# Patient Record
Sex: MALE | Race: WHITE | Employment: OTHER | URBAN - METROPOLITAN AREA
[De-identification: names, ages, dates, MRNs, and addresses within clinical notes are randomized per-mention and may not be internally consistent; named-entity substitution may affect disease eponyms.]

---

## 2019-04-06 ENCOUNTER — APPOINTMENT (OUTPATIENT)
Dept: CT IMAGING | Age: 39
End: 2019-04-06
Attending: PHYSICIAN ASSISTANT
Payer: COMMERCIAL

## 2019-04-06 ENCOUNTER — APPOINTMENT (OUTPATIENT)
Dept: GENERAL RADIOLOGY | Age: 39
End: 2019-04-06
Attending: INTERNAL MEDICINE
Payer: COMMERCIAL

## 2019-04-06 ENCOUNTER — HOSPITAL ENCOUNTER (OUTPATIENT)
Age: 39
Setting detail: OBSERVATION
Discharge: HOME | End: 2019-04-08
Attending: EMERGENCY MEDICINE | Admitting: INTERNAL MEDICINE
Payer: COMMERCIAL

## 2019-04-06 DIAGNOSIS — E87.6 HYPOKALEMIA: ICD-10-CM

## 2019-04-06 DIAGNOSIS — R42 ACUTE ONSET OF SEVERE VERTIGO: Primary | ICD-10-CM

## 2019-04-06 DIAGNOSIS — H81.10 BENIGN PAROXYSMAL POSITIONAL VERTIGO, UNSPECIFIED LATERALITY: ICD-10-CM

## 2019-04-06 PROBLEM — G45.9 TIA (TRANSIENT ISCHEMIC ATTACK): Status: ACTIVE | Noted: 2019-04-06

## 2019-04-06 LAB
ALBUMIN SERPL-MCNC: 4 G/DL (ref 3.5–5)
ALBUMIN/GLOB SERPL: 1.3 {RATIO} (ref 1.1–2.2)
ALP SERPL-CCNC: 55 U/L (ref 45–117)
ALT SERPL-CCNC: 63 U/L (ref 12–78)
ANION GAP SERPL CALC-SCNC: 11 MMOL/L (ref 5–15)
AST SERPL-CCNC: 30 U/L (ref 15–37)
ATRIAL RATE: 77 BPM
BASOPHILS # BLD: 0 K/UL (ref 0–0.1)
BASOPHILS NFR BLD: 0 % (ref 0–1)
BILIRUB SERPL-MCNC: 0.9 MG/DL (ref 0.2–1)
BUN SERPL-MCNC: 20 MG/DL (ref 6–20)
BUN/CREAT SERPL: 21 (ref 12–20)
CALCIUM SERPL-MCNC: 8.4 MG/DL (ref 8.5–10.1)
CALCULATED P AXIS, ECG09: 27 DEGREES
CALCULATED R AXIS, ECG10: 29 DEGREES
CALCULATED T AXIS, ECG11: 12 DEGREES
CHLORIDE SERPL-SCNC: 103 MMOL/L (ref 97–108)
CO2 SERPL-SCNC: 24 MMOL/L (ref 21–32)
CREAT SERPL-MCNC: 0.94 MG/DL (ref 0.7–1.3)
DIAGNOSIS, 93000: NORMAL
DIFFERENTIAL METHOD BLD: ABNORMAL
EOSINOPHIL # BLD: 0.2 K/UL (ref 0–0.4)
EOSINOPHIL NFR BLD: 2 % (ref 0–7)
ERYTHROCYTE [DISTWIDTH] IN BLOOD BY AUTOMATED COUNT: 12.4 % (ref 11.5–14.5)
GLOBULIN SER CALC-MCNC: 3 G/DL (ref 2–4)
GLUCOSE SERPL-MCNC: 154 MG/DL (ref 65–100)
HCT VFR BLD AUTO: 43.2 % (ref 36.6–50.3)
HGB BLD-MCNC: 16.1 G/DL (ref 12.1–17)
IMM GRANULOCYTES # BLD AUTO: 0.1 K/UL (ref 0–0.04)
IMM GRANULOCYTES NFR BLD AUTO: 1 % (ref 0–0.5)
LYMPHOCYTES # BLD: 2.3 K/UL (ref 0.8–3.5)
LYMPHOCYTES NFR BLD: 23 % (ref 12–49)
MCH RBC QN AUTO: 30.8 PG (ref 26–34)
MCHC RBC AUTO-ENTMCNC: 37.3 G/DL (ref 30–36.5)
MCV RBC AUTO: 82.8 FL (ref 80–99)
MONOCYTES # BLD: 0.4 K/UL (ref 0–1)
MONOCYTES NFR BLD: 4 % (ref 5–13)
NEUTS SEG # BLD: 7.1 K/UL (ref 1.8–8)
NEUTS SEG NFR BLD: 70 % (ref 32–75)
NRBC # BLD: 0 K/UL (ref 0–0.01)
NRBC BLD-RTO: 0 PER 100 WBC
P-R INTERVAL, ECG05: 138 MS
PLATELET # BLD AUTO: 201 K/UL (ref 150–400)
PMV BLD AUTO: 11.5 FL (ref 8.9–12.9)
POTASSIUM SERPL-SCNC: 2.7 MMOL/L (ref 3.5–5.1)
PROT SERPL-MCNC: 7 G/DL (ref 6.4–8.2)
Q-T INTERVAL, ECG07: 416 MS
QRS DURATION, ECG06: 90 MS
QTC CALCULATION (BEZET), ECG08: 470 MS
RBC # BLD AUTO: 5.22 M/UL (ref 4.1–5.7)
SODIUM SERPL-SCNC: 138 MMOL/L (ref 136–145)
TROPONIN I SERPL-MCNC: <0.05 NG/ML
VENTRICULAR RATE, ECG03: 77 BPM
WBC # BLD AUTO: 10.2 K/UL (ref 4.1–11.1)

## 2019-04-06 PROCEDURE — 74011250636 HC RX REV CODE- 250/636: Performed by: PHYSICIAN ASSISTANT

## 2019-04-06 PROCEDURE — 85025 COMPLETE CBC W/AUTO DIFF WBC: CPT

## 2019-04-06 PROCEDURE — 96361 HYDRATE IV INFUSION ADD-ON: CPT

## 2019-04-06 PROCEDURE — 96366 THER/PROPH/DIAG IV INF ADDON: CPT

## 2019-04-06 PROCEDURE — 96365 THER/PROPH/DIAG IV INF INIT: CPT

## 2019-04-06 PROCEDURE — 99218 HC RM OBSERVATION: CPT

## 2019-04-06 PROCEDURE — 96375 TX/PRO/DX INJ NEW DRUG ADDON: CPT

## 2019-04-06 PROCEDURE — 36415 COLL VENOUS BLD VENIPUNCTURE: CPT

## 2019-04-06 PROCEDURE — 96372 THER/PROPH/DIAG INJ SC/IM: CPT

## 2019-04-06 PROCEDURE — 80053 COMPREHEN METABOLIC PANEL: CPT

## 2019-04-06 PROCEDURE — 99284 EMERGENCY DEPT VISIT MOD MDM: CPT

## 2019-04-06 PROCEDURE — 84484 ASSAY OF TROPONIN QUANT: CPT

## 2019-04-06 PROCEDURE — 74011250636 HC RX REV CODE- 250/636: Performed by: INTERNAL MEDICINE

## 2019-04-06 PROCEDURE — 74011250636 HC RX REV CODE- 250/636

## 2019-04-06 PROCEDURE — 71045 X-RAY EXAM CHEST 1 VIEW: CPT

## 2019-04-06 PROCEDURE — 70450 CT HEAD/BRAIN W/O DYE: CPT

## 2019-04-06 RX ORDER — ONDANSETRON 2 MG/ML
INJECTION INTRAMUSCULAR; INTRAVENOUS
Status: COMPLETED
Start: 2019-04-06 | End: 2019-04-06

## 2019-04-06 RX ORDER — SODIUM CHLORIDE 9 MG/ML
1000 INJECTION, SOLUTION INTRAVENOUS CONTINUOUS
Status: DISCONTINUED | OUTPATIENT
Start: 2019-04-06 | End: 2019-04-06

## 2019-04-06 RX ORDER — SODIUM CHLORIDE 0.9 % (FLUSH) 0.9 %
5-40 SYRINGE (ML) INJECTION AS NEEDED
Status: DISCONTINUED | OUTPATIENT
Start: 2019-04-06 | End: 2019-04-08 | Stop reason: HOSPADM

## 2019-04-06 RX ORDER — ACETAMINOPHEN 325 MG/1
650 TABLET ORAL
Status: DISCONTINUED | OUTPATIENT
Start: 2019-04-06 | End: 2019-04-08 | Stop reason: HOSPADM

## 2019-04-06 RX ORDER — MECLIZINE HCL 12.5 MG 12.5 MG/1
25 TABLET ORAL
Status: DISCONTINUED | OUTPATIENT
Start: 2019-04-06 | End: 2019-04-08 | Stop reason: HOSPADM

## 2019-04-06 RX ORDER — METOCLOPRAMIDE HYDROCHLORIDE 5 MG/ML
5 INJECTION INTRAMUSCULAR; INTRAVENOUS EVERY 6 HOURS
Status: DISCONTINUED | OUTPATIENT
Start: 2019-04-07 | End: 2019-04-08 | Stop reason: HOSPADM

## 2019-04-06 RX ORDER — SODIUM CHLORIDE 9 MG/ML
75 INJECTION, SOLUTION INTRAVENOUS CONTINUOUS
Status: DISCONTINUED | OUTPATIENT
Start: 2019-04-06 | End: 2019-04-07

## 2019-04-06 RX ORDER — CITALOPRAM 10 MG/1
TABLET ORAL DAILY
COMMUNITY

## 2019-04-06 RX ORDER — POTASSIUM CHLORIDE 7.45 MG/ML
10 INJECTION INTRAVENOUS
Status: COMPLETED | OUTPATIENT
Start: 2019-04-06 | End: 2019-04-06

## 2019-04-06 RX ORDER — GUAIFENESIN 100 MG/5ML
81 LIQUID (ML) ORAL DAILY
Status: DISCONTINUED | OUTPATIENT
Start: 2019-04-07 | End: 2019-04-08 | Stop reason: HOSPADM

## 2019-04-06 RX ORDER — CITALOPRAM 20 MG/1
10 TABLET, FILM COATED ORAL DAILY
Status: DISCONTINUED | OUTPATIENT
Start: 2019-04-07 | End: 2019-04-06

## 2019-04-06 RX ORDER — DIAZEPAM 10 MG/2ML
5 INJECTION INTRAMUSCULAR
Status: DISCONTINUED | OUTPATIENT
Start: 2019-04-06 | End: 2019-04-06

## 2019-04-06 RX ORDER — ONDANSETRON 2 MG/ML
4 INJECTION INTRAMUSCULAR; INTRAVENOUS
Status: COMPLETED | OUTPATIENT
Start: 2019-04-06 | End: 2019-04-06

## 2019-04-06 RX ORDER — ONDANSETRON 2 MG/ML
4 INJECTION INTRAMUSCULAR; INTRAVENOUS
Status: DISCONTINUED | OUTPATIENT
Start: 2019-04-06 | End: 2019-04-08 | Stop reason: HOSPADM

## 2019-04-06 RX ORDER — LORAZEPAM 2 MG/ML
1 INJECTION INTRAMUSCULAR
Status: COMPLETED | OUTPATIENT
Start: 2019-04-06 | End: 2019-04-06

## 2019-04-06 RX ORDER — POTASSIUM CHLORIDE 750 MG/1
40 TABLET, FILM COATED, EXTENDED RELEASE ORAL
Status: COMPLETED | OUTPATIENT
Start: 2019-04-07 | End: 2019-04-07

## 2019-04-06 RX ORDER — POTASSIUM CHLORIDE 7.45 MG/ML
10 INJECTION INTRAVENOUS
Status: DISPENSED | OUTPATIENT
Start: 2019-04-06 | End: 2019-04-07

## 2019-04-06 RX ORDER — HEPARIN SODIUM 5000 [USP'U]/ML
5000 INJECTION, SOLUTION INTRAVENOUS; SUBCUTANEOUS EVERY 8 HOURS
Status: DISCONTINUED | OUTPATIENT
Start: 2019-04-06 | End: 2019-04-08 | Stop reason: HOSPADM

## 2019-04-06 RX ORDER — ONDANSETRON 2 MG/ML
4 INJECTION INTRAMUSCULAR; INTRAVENOUS
Status: DISCONTINUED | OUTPATIENT
Start: 2019-04-06 | End: 2019-04-06

## 2019-04-06 RX ORDER — POTASSIUM CHLORIDE AND SODIUM CHLORIDE 900; 300 MG/100ML; MG/100ML
INJECTION, SOLUTION INTRAVENOUS CONTINUOUS
Status: DISCONTINUED | OUTPATIENT
Start: 2019-04-06 | End: 2019-04-06

## 2019-04-06 RX ORDER — ACETAMINOPHEN 650 MG/1
650 SUPPOSITORY RECTAL
Status: DISCONTINUED | OUTPATIENT
Start: 2019-04-06 | End: 2019-04-08 | Stop reason: HOSPADM

## 2019-04-06 RX ORDER — SODIUM CHLORIDE 0.9 % (FLUSH) 0.9 %
5-40 SYRINGE (ML) INJECTION EVERY 8 HOURS
Status: DISCONTINUED | OUTPATIENT
Start: 2019-04-06 | End: 2019-04-08 | Stop reason: HOSPADM

## 2019-04-06 RX ORDER — MECLIZINE HCL 12.5 MG 12.5 MG/1
50 TABLET ORAL
Status: COMPLETED | OUTPATIENT
Start: 2019-04-06 | End: 2019-04-06

## 2019-04-06 RX ADMIN — ONDANSETRON 4 MG: 2 INJECTION INTRAMUSCULAR; INTRAVENOUS at 19:39

## 2019-04-06 RX ADMIN — SODIUM CHLORIDE 1000 ML: 900 INJECTION, SOLUTION INTRAVENOUS at 19:33

## 2019-04-06 RX ADMIN — Medication 10 ML: at 23:02

## 2019-04-06 RX ADMIN — LORAZEPAM 1 MG: 2 INJECTION INTRAMUSCULAR; INTRAVENOUS at 20:01

## 2019-04-06 RX ADMIN — MECLIZINE 50 MG: 12.5 TABLET ORAL at 19:33

## 2019-04-06 RX ADMIN — POTASSIUM CHLORIDE 10 MEQ: 10 INJECTION, SOLUTION INTRAVENOUS at 20:01

## 2019-04-06 RX ADMIN — SODIUM CHLORIDE 75 ML/HR: 900 INJECTION, SOLUTION INTRAVENOUS at 22:53

## 2019-04-06 RX ADMIN — SODIUM CHLORIDE AND POTASSIUM CHLORIDE: 9; 2.98 INJECTION, SOLUTION INTRAVENOUS at 21:46

## 2019-04-06 RX ADMIN — POTASSIUM CHLORIDE 10 MEQ: 10 INJECTION, SOLUTION INTRAVENOUS at 22:54

## 2019-04-06 RX ADMIN — HEPARIN SODIUM 5000 UNITS: 5000 INJECTION INTRAVENOUS; SUBCUTANEOUS at 23:01

## 2019-04-06 NOTE — ED NOTES
Pt vomited x1 when being moved to go to CT. Provider aware; zofran given. Providers at bedside. Imaging will return.

## 2019-04-06 NOTE — ED NOTES
Assumed care. Initial assessment complete and provider aware. Pt placed on stretcher in low/locked position, side-rail up, and call bell within reach. Warm blanket offered. Average wait times discussed with pt. No additional requests at this time.

## 2019-04-07 ENCOUNTER — APPOINTMENT (OUTPATIENT)
Dept: MRI IMAGING | Age: 39
End: 2019-04-07
Attending: INTERNAL MEDICINE
Payer: COMMERCIAL

## 2019-04-07 LAB
ANION GAP SERPL CALC-SCNC: 7 MMOL/L (ref 5–15)
BASOPHILS # BLD: 0 K/UL (ref 0–0.1)
BASOPHILS NFR BLD: 0 % (ref 0–1)
BUN SERPL-MCNC: 16 MG/DL (ref 6–20)
BUN/CREAT SERPL: 22 (ref 12–20)
CALCIUM SERPL-MCNC: 8.3 MG/DL (ref 8.5–10.1)
CHLORIDE SERPL-SCNC: 105 MMOL/L (ref 97–108)
CHOLEST SERPL-MCNC: 180 MG/DL
CO2 SERPL-SCNC: 27 MMOL/L (ref 21–32)
CREAT SERPL-MCNC: 0.74 MG/DL (ref 0.7–1.3)
DIFFERENTIAL METHOD BLD: ABNORMAL
EOSINOPHIL # BLD: 0 K/UL (ref 0–0.4)
EOSINOPHIL NFR BLD: 0 % (ref 0–7)
ERYTHROCYTE [DISTWIDTH] IN BLOOD BY AUTOMATED COUNT: 12.8 % (ref 11.5–14.5)
EST. AVERAGE GLUCOSE BLD GHB EST-MCNC: NORMAL MG/DL
GLUCOSE SERPL-MCNC: 110 MG/DL (ref 65–100)
HBA1C MFR BLD: 4.9 % (ref 4.2–6.3)
HCT VFR BLD AUTO: 39.5 % (ref 36.6–50.3)
HDLC SERPL-MCNC: 41 MG/DL
HDLC SERPL: 4.4 {RATIO} (ref 0–5)
HGB BLD-MCNC: 14.7 G/DL (ref 12.1–17)
IMM GRANULOCYTES # BLD AUTO: 0 K/UL (ref 0–0.04)
IMM GRANULOCYTES NFR BLD AUTO: 0 % (ref 0–0.5)
LDLC SERPL CALC-MCNC: 95.4 MG/DL (ref 0–100)
LIPID PROFILE,FLP: ABNORMAL
LYMPHOCYTES # BLD: 1 K/UL (ref 0.8–3.5)
LYMPHOCYTES NFR BLD: 10 % (ref 12–49)
MCH RBC QN AUTO: 31.6 PG (ref 26–34)
MCHC RBC AUTO-ENTMCNC: 37.2 G/DL (ref 30–36.5)
MCV RBC AUTO: 84.9 FL (ref 80–99)
MONOCYTES # BLD: 0.6 K/UL (ref 0–1)
MONOCYTES NFR BLD: 5 % (ref 5–13)
NEUTS SEG # BLD: 8.6 K/UL (ref 1.8–8)
NEUTS SEG NFR BLD: 85 % (ref 32–75)
NRBC # BLD: 0 K/UL (ref 0–0.01)
NRBC BLD-RTO: 0 PER 100 WBC
PLATELET # BLD AUTO: 164 K/UL (ref 150–400)
PMV BLD AUTO: 11.3 FL (ref 8.9–12.9)
POTASSIUM SERPL-SCNC: 4.2 MMOL/L (ref 3.5–5.1)
RBC # BLD AUTO: 4.65 M/UL (ref 4.1–5.7)
SODIUM SERPL-SCNC: 139 MMOL/L (ref 136–145)
TRIGL SERPL-MCNC: 218 MG/DL (ref ?–150)
TSH SERPL DL<=0.05 MIU/L-ACNC: 0.97 UIU/ML (ref 0.36–3.74)
VLDLC SERPL CALC-MCNC: 43.6 MG/DL
WBC # BLD AUTO: 10.2 K/UL (ref 4.1–11.1)

## 2019-04-07 PROCEDURE — 36415 COLL VENOUS BLD VENIPUNCTURE: CPT

## 2019-04-07 PROCEDURE — 74011250636 HC RX REV CODE- 250/636: Performed by: INTERNAL MEDICINE

## 2019-04-07 PROCEDURE — 84443 ASSAY THYROID STIM HORMONE: CPT

## 2019-04-07 PROCEDURE — 96372 THER/PROPH/DIAG INJ SC/IM: CPT

## 2019-04-07 PROCEDURE — 96366 THER/PROPH/DIAG IV INF ADDON: CPT

## 2019-04-07 PROCEDURE — 96376 TX/PRO/DX INJ SAME DRUG ADON: CPT

## 2019-04-07 PROCEDURE — 74011250637 HC RX REV CODE- 250/637: Performed by: INTERNAL MEDICINE

## 2019-04-07 PROCEDURE — 99218 HC RM OBSERVATION: CPT

## 2019-04-07 PROCEDURE — 70551 MRI BRAIN STEM W/O DYE: CPT

## 2019-04-07 PROCEDURE — 80061 LIPID PANEL: CPT

## 2019-04-07 PROCEDURE — 80048 BASIC METABOLIC PNL TOTAL CA: CPT

## 2019-04-07 PROCEDURE — 85025 COMPLETE CBC W/AUTO DIFF WBC: CPT

## 2019-04-07 PROCEDURE — 83036 HEMOGLOBIN GLYCOSYLATED A1C: CPT

## 2019-04-07 PROCEDURE — 74011250637 HC RX REV CODE- 250/637: Performed by: HOSPITALIST

## 2019-04-07 PROCEDURE — 96375 TX/PRO/DX INJ NEW DRUG ADDON: CPT

## 2019-04-07 RX ORDER — LANOLIN ALCOHOL/MO/W.PET/CERES
6 CREAM (GRAM) TOPICAL
Status: DISCONTINUED | OUTPATIENT
Start: 2019-04-07 | End: 2019-04-08 | Stop reason: HOSPADM

## 2019-04-07 RX ORDER — ATORVASTATIN CALCIUM 40 MG/1
40 TABLET, FILM COATED ORAL
Status: DISCONTINUED | OUTPATIENT
Start: 2019-04-07 | End: 2019-04-08 | Stop reason: HOSPADM

## 2019-04-07 RX ORDER — POTASSIUM CHLORIDE 7.45 MG/ML
10 INJECTION INTRAVENOUS
Status: COMPLETED | OUTPATIENT
Start: 2019-04-07 | End: 2019-04-07

## 2019-04-07 RX ORDER — SODIUM CHLORIDE 9 MG/ML
75 INJECTION, SOLUTION INTRAVENOUS CONTINUOUS
Status: DISPENSED | OUTPATIENT
Start: 2019-04-07 | End: 2019-04-07

## 2019-04-07 RX ADMIN — HEPARIN SODIUM 5000 UNITS: 5000 INJECTION INTRAVENOUS; SUBCUTANEOUS at 13:35

## 2019-04-07 RX ADMIN — ASPIRIN 81 MG 81 MG: 81 TABLET ORAL at 08:53

## 2019-04-07 RX ADMIN — HEPARIN SODIUM 5000 UNITS: 5000 INJECTION INTRAVENOUS; SUBCUTANEOUS at 05:32

## 2019-04-07 RX ADMIN — METOCLOPRAMIDE 5 MG: 5 INJECTION, SOLUTION INTRAMUSCULAR; INTRAVENOUS at 00:29

## 2019-04-07 RX ADMIN — Medication 10 ML: at 05:33

## 2019-04-07 RX ADMIN — HEPARIN SODIUM 5000 UNITS: 5000 INJECTION INTRAVENOUS; SUBCUTANEOUS at 22:26

## 2019-04-07 RX ADMIN — Medication 10 ML: at 13:39

## 2019-04-07 RX ADMIN — SODIUM CHLORIDE 75 ML/HR: 900 INJECTION, SOLUTION INTRAVENOUS at 13:36

## 2019-04-07 RX ADMIN — METOCLOPRAMIDE 5 MG: 5 INJECTION, SOLUTION INTRAMUSCULAR; INTRAVENOUS at 18:00

## 2019-04-07 RX ADMIN — Medication 10 ML: at 22:27

## 2019-04-07 RX ADMIN — POTASSIUM CHLORIDE 10 MEQ: 10 INJECTION, SOLUTION INTRAVENOUS at 00:28

## 2019-04-07 RX ADMIN — ONDANSETRON 4 MG: 2 INJECTION INTRAMUSCULAR; INTRAVENOUS at 08:55

## 2019-04-07 RX ADMIN — METOCLOPRAMIDE 5 MG: 5 INJECTION, SOLUTION INTRAMUSCULAR; INTRAVENOUS at 23:24

## 2019-04-07 RX ADMIN — METOCLOPRAMIDE 5 MG: 5 INJECTION, SOLUTION INTRAMUSCULAR; INTRAVENOUS at 13:35

## 2019-04-07 RX ADMIN — POTASSIUM CHLORIDE 40 MEQ: 750 TABLET, EXTENDED RELEASE ORAL at 02:57

## 2019-04-07 RX ADMIN — ATORVASTATIN CALCIUM 40 MG: 40 TABLET, FILM COATED ORAL at 22:26

## 2019-04-07 RX ADMIN — METOCLOPRAMIDE 5 MG: 5 INJECTION, SOLUTION INTRAMUSCULAR; INTRAVENOUS at 05:32

## 2019-04-07 RX ADMIN — MELATONIN TAB 3 MG 6 MG: 3 TAB at 23:24

## 2019-04-07 RX ADMIN — ONDANSETRON 4 MG: 2 INJECTION INTRAMUSCULAR; INTRAVENOUS at 03:04

## 2019-04-07 RX ADMIN — POTASSIUM CHLORIDE 10 MEQ: 10 INJECTION, SOLUTION INTRAVENOUS at 02:57

## 2019-04-07 RX ADMIN — MECLIZINE 25 MG: 12.5 TABLET ORAL at 09:03

## 2019-04-07 RX ADMIN — Medication 10 ML: at 23:26

## 2019-04-07 NOTE — ROUTINE PROCESS
Bedside and Verbal shift change report given to Arianne (oncoming nurse) by Charles Mccain (offgoing nurse). Report included the following information SBAR, Kardex, ED Summary, MAR, Recent Results and Cardiac Rhythm NSR. Zone Phone:   [phone] Significant changes during shift:  Seen by neuro, speech, MRI Patient Information Ronald Staples 
39 y.o. 
4/6/2019  6:53 PM by Shannon Dixon MD. Ronald Staples was admitted from Home 
 
Problem List 
 
Patient Active Problem List  
 Diagnosis Date Noted  TIA (transient ischemic attack) 04/06/2019  BPPV (benign paroxysmal positional vertigo) 04/06/2019 No past medical history on file. Core Measures: CVA: Yes Yes CHF:No No 
PNA:No No 
 
Activity Status: OOB to Chair Yes Ambulated this shift No  
Bed Rest No 
 
 
 
LINES AND DRAINS: 
 
PIV 
 
DVT prophylaxis: DVT prophylaxis Med- Yes DVT prophylaxis SCD or CLAUDIA- No  
 
Wounds: (If Applicable) Wounds- No 
 
Location Patient Safety: 
 
Falls Score Total Score: 1 Safety Level_______ Bed Alarm On? No 
Sitter? No 
 
Plan for upcoming shift: echo, carotids Discharge Plan: No  
 
Active Consults: 
IP CONSULT TO HOSPITALIST 
IP CONSULT TO NEUROLOGY

## 2019-04-07 NOTE — PROGRESS NOTES
Speech pathology Patient in with nausea and vomiting. MRI negative. STAND completed and WFL. No changes in speech. Formal speech eval not indicated. Will complete orders.  Shaina Mir M.S. MORGAN-SLP

## 2019-04-07 NOTE — PROGRESS NOTES
Pt took oral Potassium as ordered. Within minutes of taking the pills he began to vomit a lot. Gave Zofran IV.

## 2019-04-07 NOTE — ED PROVIDER NOTES
EMERGENCY DEPARTMENT HISTORY AND PHYSICAL EXAM 
 
 
Date: 4/6/2019 Patient Name: Jersey Vivar History of Presenting Illness Chief Complaint Patient presents with  Vomiting Patient arrvied via EMS for vomiting with dizziness, BS en route 144, wife reports pt became dizzy and diaphoretic with vomiting 1.5 hours PTA  Dizziness History Provided By: Patient, Patient's Mother and Patient's Wife HPI: Jersey Vivar, 42 y.o. male with PMHx significant for depression, presents via EMS to the ED with cc of sudden onset dizziness 1.5 hours ago. Patient was standing in the kitchen when he suddenly began feeling like the room was spinning. He had to lower himself to the ground and then began vomiting. Symptoms have been persistent and severe. They are worse when he opens his eyes and when he moves his head. He denies headache, visual changes, numbness, focal weakness, chest pain, shortness of breath, abdominal pain, diarrhea, urinary symptoms. He just got back from a cruise yesterday and noted some pressure and fullness in his left ear. There are no other complaints, changes, or physical findings at this time. PCP: Breezy Keith MD 
 
No current facility-administered medications on file prior to encounter. Current Outpatient Medications on File Prior to Encounter Medication Sig Dispense Refill  citalopram (CELEXA) 10 mg tablet Take  by mouth daily. Past History Past Medical History: 
Depression Past Surgical History: No past surgical history on file. Family History: No family history on file. Social History: 
Social History Tobacco Use  Smoking status: Never Smoker  Smokeless tobacco: Never Used Substance Use Topics  Alcohol use: Yes Alcohol/week: 4.8 oz Types: 8 Cans of beer per week  Drug use: Never Allergies: 
No Known Allergies Review of Systems Review of Systems Constitutional: Negative for chills and fever. HENT: Negative for ear pain and sore throat. Eyes: Negative for redness and visual disturbance. Respiratory: Negative for cough and shortness of breath. Cardiovascular: Negative for chest pain and palpitations. Gastrointestinal: Positive for nausea and vomiting. Negative for abdominal pain. Genitourinary: Negative for dysuria and hematuria. Musculoskeletal: Negative for back pain and gait problem. Skin: Negative for rash and wound. Neurological: Positive for dizziness. Negative for weakness, numbness and headaches. Psychiatric/Behavioral: Negative for behavioral problems and confusion. All other systems reviewed and are negative. Physical Exam  
Physical Exam  
Constitutional: He is oriented to person, place, and time. He appears well-developed and well-nourished. Slightly diaphoretic and uncomfortable appearing. HENT:  
Head: Normocephalic and atraumatic. Eyes: Pupils are equal, round, and reactive to light. Conjunctivae and EOM are normal.  
Neck: Normal range of motion. Neck supple. Cardiovascular: Normal rate, regular rhythm and normal heart sounds. Pulmonary/Chest: Effort normal and breath sounds normal. No respiratory distress. He has no wheezes. Musculoskeletal: Normal range of motion. Neurological: He is alert and oriented to person, place, and time. He has normal strength. No cranial nerve deficit or sensory deficit. GCS eye subscore is 4. GCS verbal subscore is 5. GCS motor subscore is 6. Persistent lateral nystagmus with slow phase to the right. No vertical nystagmus noted. Skin: Skin is warm and dry. No rash noted. Psychiatric: He has a normal mood and affect. His behavior is normal.  
Nursing note and vitals reviewed. Diagnostic Study Results Labs - Recent Results (from the past 12 hour(s)) CBC WITH AUTOMATED DIFF Collection Time: 04/06/19  7:20 PM  
Result Value Ref Range WBC 10.2 4.1 - 11.1 K/uL  
 RBC 5.22 4.10 - 5.70 M/uL  
 HGB 16.1 12.1 - 17.0 g/dL HCT 43.2 36.6 - 50.3 % MCV 82.8 80.0 - 99.0 FL  
 MCH 30.8 26.0 - 34.0 PG  
 MCHC 37.3 (H) 30.0 - 36.5 g/dL  
 RDW 12.4 11.5 - 14.5 % PLATELET 450 157 - 718 K/uL MPV 11.5 8.9 - 12.9 FL  
 NRBC 0.0 0  WBC ABSOLUTE NRBC 0.00 0.00 - 0.01 K/uL NEUTROPHILS 70 32 - 75 % LYMPHOCYTES 23 12 - 49 % MONOCYTES 4 (L) 5 - 13 % EOSINOPHILS 2 0 - 7 % BASOPHILS 0 0 - 1 % IMMATURE GRANULOCYTES 1 (H) 0.0 - 0.5 % ABS. NEUTROPHILS 7.1 1.8 - 8.0 K/UL  
 ABS. LYMPHOCYTES 2.3 0.8 - 3.5 K/UL  
 ABS. MONOCYTES 0.4 0.0 - 1.0 K/UL  
 ABS. EOSINOPHILS 0.2 0.0 - 0.4 K/UL  
 ABS. BASOPHILS 0.0 0.0 - 0.1 K/UL  
 ABS. IMM. GRANS. 0.1 (H) 0.00 - 0.04 K/UL  
 DF AUTOMATED METABOLIC PANEL, COMPREHENSIVE Collection Time: 04/06/19  7:20 PM  
Result Value Ref Range Sodium 138 136 - 145 mmol/L Potassium 2.7 (LL) 3.5 - 5.1 mmol/L Chloride 103 97 - 108 mmol/L  
 CO2 24 21 - 32 mmol/L Anion gap 11 5 - 15 mmol/L Glucose 154 (H) 65 - 100 mg/dL BUN 20 6 - 20 MG/DL Creatinine 0.94 0.70 - 1.30 MG/DL  
 BUN/Creatinine ratio 21 (H) 12 - 20 GFR est AA >60 >60 ml/min/1.73m2 GFR est non-AA >60 >60 ml/min/1.73m2 Calcium 8.4 (L) 8.5 - 10.1 MG/DL Bilirubin, total 0.9 0.2 - 1.0 MG/DL  
 ALT (SGPT) 63 12 - 78 U/L  
 AST (SGOT) 30 15 - 37 U/L Alk. phosphatase 55 45 - 117 U/L Protein, total 7.0 6.4 - 8.2 g/dL Albumin 4.0 3.5 - 5.0 g/dL Globulin 3.0 2.0 - 4.0 g/dL A-G Ratio 1.3 1.1 - 2.2    
TROPONIN I Collection Time: 04/06/19  9:11 PM  
Result Value Ref Range Troponin-I, Qt. <0.05 <0.05 ng/mL EKG, 12 LEAD, INITIAL Collection Time: 02/07/06  6:28 AM  
Result Value Ref Range Ventricular Rate 77 BPM  
 Atrial Rate 77 BPM  
 P-R Interval 138 ms QRS Duration 90 ms Q-T Interval 416 ms  
 QTC Calculation (Bezet) 470 ms Calculated P Axis 27 degrees Calculated R Axis 29 degrees Calculated T Axis 12 degrees Diagnosis Normal sinus rhythm Normal ECG When compared with ECG of 28-JAN-2012 00:54, No significant change was found Radiologic Studies -  
CT HEAD WO CONT Final Result IMPRESSION:  
  
No acute intracranial abnormality CT Results  (Last 48 hours) 04/06/19 2037  CT HEAD WO CONT Final result Impression:  IMPRESSION:  
   
No acute intracranial abnormality Narrative:  EXAM: CT HEAD WO CONT INDICATION: Sudden onset vertigo COMPARISON: 1/28/2012. CONTRAST: None. TECHNIQUE: Unenhanced CT of the head was performed using 5 mm images. Brain and  
bone windows were generated. CT dose reduction was achieved through use of a  
standardized protocol tailored for this examination and automatic exposure  
control for dose modulation. FINDINGS:  
The ventricles and sulci are normal in size, shape and configuration and  
midline. There is no significant white matter disease. There is no intracranial  
hemorrhage, extra-axial collection, mass, mass effect or midline shift. The  
basilar cisterns are open. No acute infarct is identified. The bone windows  
demonstrate no abnormalities. The visualized portions of the paranasal sinuses  
and mastoid air cells are clear. CXR Results  (Last 48 hours) None Medical Decision Making I am the first provider for this patient. I reviewed the vital signs, available nursing notes, past medical history, past surgical history, family history and social history. Vital Signs-Reviewed the patient's vital signs. Patient Vitals for the past 12 hrs: 
 Pulse Resp BP SpO2  
04/06/19 2130 85 18 121/68 96 % 04/06/19 1930 79 20 121/74 91 % Pulse Oximetry Analysis - 96% on RA 
 
EKG interpretation: (Preliminary) Rhythm: normal sinus rhythm. Rate (approx.): 77;  Axis: normal; MN interval: normal; QRS interval: normal ; ST/T wave: normal; Other findings: normal. 
 
Records Reviewed: Nursing Notes and Old Medical Records Provider Notes (Medical Decision Making):  
Patient presents with acute onset of severe vertigo. Differential diagnosis includes central versus peripheral cause. Due to the severity of the symptoms, it is difficult to differentiate between central versus peripheral, however it seems unlikely given he is healthy and 42years old. Plan for labs, EKG, CT of the head, and reassess. ED Course:  
Initial assessment performed. The patients presenting problems have been discussed, and they are in agreement with the care plan formulated and outlined with them. I have encouraged them to ask questions as they arise throughout their visit. ED Course as of Apr 06 2217 Sat Apr 06, 2019  
1950 Dr. Joyce Kimbrough assessed the patient. [JAIR] 2100 Reevaluated the patient and discussed results. He still cannot open his eyes or move his head without severe vertigo. [JAIR] 2112 Discussed case with Dr. Lance Arreguin, hospitalist. He will evaluate patient for admission. [JAIR] ED Course User Index [JAIR] Terra Chao [place] Disposition: 
9:17 PM 
Patient is being admitted to the hospital by Dr. Lance Arreguin. The results of their tests and reasons for their admission have been discussed with them and/or available family. They convey agreement and understanding for the need to be admitted and for their admission diagnosis. Consultation has been made with the inpatient physician specialist for hospitalization. Diagnosis Clinical Impression: 1. Acute onset of severe vertigo 2. Hypokalemia Amirah Villalba.  ISAIAH Moe

## 2019-04-07 NOTE — PROGRESS NOTES
Bedside and Verbal shift change report given to [facility], RN (oncoming nurse) by Jordon Hernandez (offgoing nurse). Report included the following information SBAR, Kardex, Intake/Output, MAR and Cardiac Rhythm NSR.

## 2019-04-07 NOTE — H&P
Hospitalist Admission NoteNAME: Ren Sánchez :  1980 MRN:  [id number] Date/Time:  2019 10:26 PM 
 
Patient PCP: Cynthia Palacios MD 
________________________________________________________________________ My assessment of this patient's clinical condition and my plan of care is as follows. Assessment / Plan: 
Dizziness with nausea and vomiting likely related to BPPV however given bilateral tingling will rule out TIA Start meclizine as needed and consult occupational therapy for repositioning maneuvers Start gentle hydration with normal saline Start Zofran and Reglan for nausea Start clear liquid diet We will also initiate complete workup for TIA Check MRI of the brain, ultrasound carotids and 2D echocardiogram 
Check hemoglobin A1c, TSH and fasting lipid profile Start aspirin 81 mg daily We will consult OT Consult neurology Check orthostatic vitals in a.m., telemetry monitoring and follow results of 2D echocardiogram  
cxr ordered in view of vomiting Hypokalemia due to vomiting Replaced with iv and po KCl and recheck in a.m. Generalized anxiety disorder Hold Celexa due to interaction with Reglan Code Status: full Surrogate Decision Maker: Wife Walter Magallon DVT Prophylaxis: heparin GI Prophylaxis: not indicated Baseline: From home independent Subjective: CHIEF COMPLAINT: Dizziness and b/l tingling of both arms HISTORY OF PRESENT ILLNESS:    
This is a 61-year-old male with past medical history of generalized anxiety disorder is coming to the hospital with chief complaints of dizziness and also bilateral tingling of both arms.  Patient reports being in his usual state of health until about this evening around 5 when he started having dizziness which she described as room spinning.  He lowered himself to the ground and had at least 5 episodes of vomiting.  He also reports having associated tingling involving both his arms.  He does not report any weakness, slurred speech vision loss.  Does not report any chest pain, shortness of breath, cough or phlegm.  Does not report any abdominal pain.  Denies dysuria or urgency. On arrival to the hospital his vital signs were within normal limits.  On lab work he was noted to have a potassium of 2.7 otherwise rest of the vitals were within normal limits.  He had a CT head which showed no acute process. We were asked to admit for work up and evaluation of the above problems. PMH 
DASIA 
 
PSH None Social History Tobacco Use  Smoking status: Never Smoker  Smokeless tobacco: Never Used Substance Use Topics  Alcohol use: Yes Alcohol/week: 4.8 oz Types: 8 Cans of beer per week Family History No cad in the family No Known Allergies Prior to Admission medications Medication Sig Start Date End Date Taking? Authorizing Provider  
citalopram (CELEXA) 10 mg tablet Take  by mouth daily. Yes Other, MD Albertina  
 
 
REVIEW OF SYSTEMS:    
I am not able to complete the review of systems because: The patient is intubated and sedated The patient has altered mental status due to his acute medical problems The patient has baseline aphasia from prior stroke(s) The patient has baseline dementia and is not reliable historian The patient is in acute medical distress and unable to provide information Total of 12 systems reviewed as follows:   
   POSITIVE= underlined text  Negative = text not underlined General:  fever, chills, sweats, generalized weakness, weight loss/gain,  
   loss of appetite Eyes:    blurred vision, eye pain, loss of vision, double vision ENT:    rhinorrhea, pharyngitis Respiratory:   cough, sputum production, SOB, TINAJERO, wheezing, pleuritic pain  
Cardiology:   chest pain, palpitations, orthopnea, PND, edema, syncope Gastrointestinal:  abdominal pain , N/V, diarrhea, dysphagia, constipation, bleeding Genitourinary:  frequency, urgency, dysuria, hematuria, incontinence Muskuloskeletal :  arthralgia, myalgia, back pain Hematology:  easy bruising, nose or gum bleeding, lymphadenopathy Dermatological: rash, ulceration, pruritis, color change / jaundice Endocrine:   hot flashes or polydipsia Neurological:  headache, dizziness, confusion, focal weakness, paresthesia, Speech difficulties, memory loss, gait difficulty Psychological: Feelings of anxiety, depression, agitation Objective: VITALS:   
Visit Vitals /68 Pulse 85 Resp 18 SpO2 96% PHYSICAL EXAM: 
 
 
_______________________________________________________________________ Care Plan discussed with: 
  Comments Patient y Family RN y   
Care Manager Consultant:     
_______________________________________________________________________ Expected  Disposition:  
Home with Family y HH/PT/OT/RN   
SNF/LTC   
HARMONY   
________________________________________________________________________ TOTAL TIME:  50  Minutes Critical Care Provided     Minutes non procedure based Comments  
 y Reviewed previous records  
>50% of visit spent in counseling and coordination of care y Discussion with patient and/or family and questions answered 
  
 
________________________________________________________________________ Signed: Yoli Paiz MD 
 
Procedures: see electronic medical records for all procedures/Xrays and details which were not copied into this note but were reviewed prior to creation of Plan. LAB DATA REVIEWED:   
Recent Results (from the past 24 hour(s)) CBC WITH AUTOMATED DIFF Collection Time: 04/06/19  7:20 PM  
Result Value Ref Range WBC 10.2 4.1 - 11.1 K/uL  
 RBC 5.22 4.10 - 5.70 M/uL  
 HGB 16.1 12.1 - 17.0 g/dL HCT 43.2 36.6 - 50.3 % MCV 82.8 80.0 - 99.0 FL  
 MCH 30.8 26.0 - 34.0 PG  
 MCHC 37.3 (H) 30.0 - 36.5 g/dL  
 RDW 12.4 11.5 - 14.5 % PLATELET 794 344 - 045 K/uL MPV 11.5 8.9 - 12.9 FL  
 NRBC 0.0 0  WBC ABSOLUTE NRBC 0.00 0.00 - 0.01 K/uL NEUTROPHILS 70 32 - 75 % LYMPHOCYTES 23 12 - 49 % MONOCYTES 4 (L) 5 - 13 % EOSINOPHILS 2 0 - 7 % BASOPHILS 0 0 - 1 % IMMATURE GRANULOCYTES 1 (H) 0.0 - 0.5 % ABS. NEUTROPHILS 7.1 1.8 - 8.0 K/UL  
 ABS. LYMPHOCYTES 2.3 0.8 - 3.5 K/UL  
 ABS. MONOCYTES 0.4 0.0 - 1.0 K/UL  
 ABS. EOSINOPHILS 0.2 0.0 - 0.4 K/UL  
 ABS. BASOPHILS 0.0 0.0 - 0.1 K/UL  
 ABS. IMM. GRANS. 0.1 (H) 0.00 - 0.04 K/UL  
 DF AUTOMATED METABOLIC PANEL, COMPREHENSIVE Collection Time: 04/06/19  7:20 PM  
Result Value Ref Range Sodium 138 136 - 145 mmol/L Potassium 2.7 (LL) 3.5 - 5.1 mmol/L Chloride 103 97 - 108 mmol/L  
 CO2 24 21 - 32 mmol/L Anion gap 11 5 - 15 mmol/L Glucose 154 (H) 65 - 100 mg/dL BUN 20 6 - 20 MG/DL Creatinine 0.94 0.70 - 1.30 MG/DL  
 BUN/Creatinine ratio 21 (H) 12 - 20 GFR est AA >60 >60 ml/min/1.73m2 GFR est non-AA >60 >60 ml/min/1.73m2  Calcium 8.4 (L) 8.5 - 10.1 MG/DL  
 Bilirubin, total 0.9 0.2 - 1.0 MG/DL  
 ALT (SGPT) 63 12 - 78 U/L  
 AST (SGOT) 30 15 - 37 U/L Alk. phosphatase 55 45 - 117 U/L Protein, total 7.0 6.4 - 8.2 g/dL Albumin 4.0 3.5 - 5.0 g/dL Globulin 3.0 2.0 - 4.0 g/dL A-G Ratio 1.3 1.1 - 2.2    
TROPONIN I Collection Time: 04/06/19  9:11 PM  
Result Value Ref Range Troponin-I, Qt. <0.05 <0.05 ng/mL EKG, 12 LEAD, INITIAL Collection Time: 02/07/06  6:28 AM  
Result Value Ref Range Ventricular Rate 77 BPM  
 Atrial Rate 77 BPM  
 P-R Interval 138 ms QRS Duration 90 ms Q-T Interval 416 ms  
 QTC Calculation (Bezet) 470 ms Calculated P Axis 27 degrees Calculated R Axis 29 degrees Calculated T Axis 12 degrees Diagnosis Normal sinus rhythm Normal ECG When compared with ECG of 28-JAN-2012 00:54, No significant change was found

## 2019-04-07 NOTE — ED NOTES
Pt reconnected to fluids and vitals. Complains of burning at IV site; potassium infusion rate decreased to 7.5 mEq/hr.

## 2019-04-07 NOTE — ED NOTES
Pt complained of burning at site. Continued to decrease rate without success. New IV established and potassium restarted. Pt complains of burning at 50mL/hr, but not 25mL/hr.

## 2019-04-07 NOTE — CONSULTS
NEUROLOGY NOTE     Chief Complaint   Patient presents with    Vomiting     Patient arrvied via EMS for vomiting with dizziness, BS en route 144, wife reports pt became dizzy and diaphoretic with vomiting 1.5 hours PTA    Dizziness       Reason for Consult  I have been asked to see the patient in neurological consultation by Dorian Quiroz MD to render advice and opinion regarding possible stroke    [facility]  Alireza Hernandez is a 42 y.o. male who presents to the hospital because of vertigo. Yesterday he was in the kitchen and had sudden onset room spinning sensation. It got worse with eye opening and movement of the head. Pt came tot hospital. MRI brain was normal. Symptoms much better now. ROS  A ten system review of constitutional, cardiovascular, respiratory, musculoskeletal, endocrine, skin, SHEENT, genitourinary, psychiatric and neurologic systems was obtained and is unremarkable except as stated in HPI     PMH  No past medical history on file. FH  No family history on file. SH  Social History     Socioeconomic History    Marital status:      Spouse name: Not on file    Number of children: Not on file    Years of education: Not on file    Highest education level: Not on file   Tobacco Use    Smoking status: Never Smoker    Smokeless tobacco: Never Used   Substance and Sexual Activity    Alcohol use:  Yes     Alcohol/week: 4.8 oz     Types: 8 Cans of beer per week    Drug use: Never    Sexual activity: Yes       ALLERGIES  No Known Allergies    PHYSICAL EXAMINATION:   Patient Vitals for the past 24 hrs:   Temp Pulse Resp BP SpO2   04/07/19 1145 98.1 °F (36.7 °C) 88 18 118/72 98 %   04/07/19 0800 97.9 °F (36.6 °C) 79 18 134/81 98 %   04/07/19 0400 97.4 °F (36.3 °C) 79 18 139/88 99 %   04/07/19 0130 97.6 °F (36.4 °C) 75 19 119/71 96 %   04/07/19 0100  76 28 126/77 96 %   04/07/19 0030  78 19 120/79 97 %   04/06/19 2330  77 20 133/75 96 %   04/06/19 2130  85 18 121/68 96 %   04/06/19 1930  79 20 121/74 91 %        General:   General appearance: Pt is in no acute distress   Distal pulses are preserved  Fundoscopic exam: attempted    Neurological Examination:   Mental Status:  AAO x3. Speech is fluent. Follows commands, has normal fund of knowledge, attention, short term recall, comprehension and insight. Cranial Nerves: Visual fields are full. PERRL, Extraocular movements are full. Facial sensation intact. Facial movement intact. Hearing intact to conversation. Palate elevates symmetrically. Shoulder shrug symmetric. Tongue midline. Motor: Strength is 5/5 in all 4 ext. Normal tone. No atrophy. Sensation: Normal to light touch    Reflexes: DTRs 2+ throughout. Plantar responses downgoing. Coordination/Cerebellar: Intact to finger-nose-finger     Gait: deferred    Skin: No significant bruising or lacerations. LAB DATA REVIEWED:    Recent Results (from the past 24 hour(s))   CBC WITH AUTOMATED DIFF    Collection Time: 04/06/19  7:20 PM   Result Value Ref Range    WBC 10.2 4.1 - 11.1 K/uL    RBC 5.22 4.10 - 5.70 M/uL    HGB 16.1 12.1 - 17.0 g/dL    HCT 43.2 36.6 - 50.3 %    MCV 82.8 80.0 - 99.0 FL    MCH 30.8 26.0 - 34.0 PG    MCHC 37.3 (H) 30.0 - 36.5 g/dL    RDW 12.4 11.5 - 14.5 %    PLATELET 169 474 - 941 K/uL    MPV 11.5 8.9 - 12.9 FL    NRBC 0.0 0  WBC    ABSOLUTE NRBC 0.00 0.00 - 0.01 K/uL    NEUTROPHILS 70 32 - 75 %    LYMPHOCYTES 23 12 - 49 %    MONOCYTES 4 (L) 5 - 13 %    EOSINOPHILS 2 0 - 7 %    BASOPHILS 0 0 - 1 %    IMMATURE GRANULOCYTES 1 (H) 0.0 - 0.5 %    ABS. NEUTROPHILS 7.1 1.8 - 8.0 K/UL    ABS. LYMPHOCYTES 2.3 0.8 - 3.5 K/UL    ABS. MONOCYTES 0.4 0.0 - 1.0 K/UL    ABS. EOSINOPHILS 0.2 0.0 - 0.4 K/UL    ABS. BASOPHILS 0.0 0.0 - 0.1 K/UL    ABS. IMM.  GRANS. 0.1 (H) 0.00 - 0.04 K/UL    DF AUTOMATED     METABOLIC PANEL, COMPREHENSIVE    Collection Time: 04/06/19  7:20 PM   Result Value Ref Range    Sodium 138 136 - 145 mmol/L    Potassium 2.7 (LL) 3.5 - 5.1 mmol/L Chloride 103 97 - 108 mmol/L    CO2 24 21 - 32 mmol/L    Anion gap 11 5 - 15 mmol/L    Glucose 154 (H) 65 - 100 mg/dL    BUN 20 6 - 20 MG/DL    Creatinine 0.94 0.70 - 1.30 MG/DL    BUN/Creatinine ratio 21 (H) 12 - 20      GFR est AA >60 >60 ml/min/1.73m2    GFR est non-AA >60 >60 ml/min/1.73m2    Calcium 8.4 (L) 8.5 - 10.1 MG/DL    Bilirubin, total 0.9 0.2 - 1.0 MG/DL    ALT (SGPT) 63 12 - 78 U/L    AST (SGOT) 30 15 - 37 U/L    Alk.  phosphatase 55 45 - 117 U/L    Protein, total 7.0 6.4 - 8.2 g/dL    Albumin 4.0 3.5 - 5.0 g/dL    Globulin 3.0 2.0 - 4.0 g/dL    A-G Ratio 1.3 1.1 - 2.2     TROPONIN I    Collection Time: 04/06/19  9:11 PM   Result Value Ref Range    Troponin-I, Qt. <0.05 <0.05 ng/mL   LIPID PANEL    Collection Time: 04/07/19  5:21 AM   Result Value Ref Range    LIPID PROFILE          Cholesterol, total 180 <200 MG/DL    Triglyceride 218 (H) <150 MG/DL    HDL Cholesterol 41 MG/DL    LDL, calculated 95.4 0 - 100 MG/DL    VLDL, calculated 43.6 MG/DL    CHOL/HDL Ratio 4.4 0.0 - 5.0     HEMOGLOBIN A1C WITH EAG    Collection Time: 04/07/19  5:21 AM   Result Value Ref Range    Hemoglobin A1c 4.9 4.2 - 6.3 %    Est. average glucose Cannot be calculated mg/dL   METABOLIC PANEL, BASIC    Collection Time: 04/07/19  5:21 AM   Result Value Ref Range    Sodium 139 136 - 145 mmol/L    Potassium 4.2 3.5 - 5.1 mmol/L    Chloride 105 97 - 108 mmol/L    CO2 27 21 - 32 mmol/L    Anion gap 7 5 - 15 mmol/L    Glucose 110 (H) 65 - 100 mg/dL    BUN 16 6 - 20 MG/DL    Creatinine 0.74 0.70 - 1.30 MG/DL    BUN/Creatinine ratio 22 (H) 12 - 20      GFR est AA >60 >60 ml/min/1.73m2    GFR est non-AA >60 >60 ml/min/1.73m2    Calcium 8.3 (L) 8.5 - 10.1 MG/DL   CBC WITH AUTOMATED DIFF    Collection Time: 04/07/19  5:21 AM   Result Value Ref Range    WBC 10.2 4.1 - 11.1 K/uL    RBC 4.65 4.10 - 5.70 M/uL    HGB 14.7 12.1 - 17.0 g/dL    HCT 39.5 36.6 - 50.3 %    MCV 84.9 80.0 - 99.0 FL    MCH 31.6 26.0 - 34.0 PG    MCHC 37.2 (H) 30.0 - 36.5 g/dL    RDW 12.8 11.5 - 14.5 %    PLATELET 844 017 - 945 K/uL    MPV 11.3 8.9 - 12.9 FL    NRBC 0.0 0  WBC    ABSOLUTE NRBC 0.00 0.00 - 0.01 K/uL    NEUTROPHILS 85 (H) 32 - 75 %    LYMPHOCYTES 10 (L) 12 - 49 %    MONOCYTES 5 5 - 13 %    EOSINOPHILS 0 0 - 7 %    BASOPHILS 0 0 - 1 %    IMMATURE GRANULOCYTES 0 0.0 - 0.5 %    ABS. NEUTROPHILS 8.6 (H) 1.8 - 8.0 K/UL    ABS. LYMPHOCYTES 1.0 0.8 - 3.5 K/UL    ABS. MONOCYTES 0.6 0.0 - 1.0 K/UL    ABS. EOSINOPHILS 0.0 0.0 - 0.4 K/UL    ABS. BASOPHILS 0.0 0.0 - 0.1 K/UL    ABS. IMM. GRANS. 0.0 0.00 - 0.04 K/UL    DF AUTOMATED     TSH 3RD GENERATION    Collection Time: 04/07/19  5:21 AM   Result Value Ref Range    TSH 0.97 0.36 - 3.74 uIU/mL   EKG, 12 LEAD, INITIAL    Collection Time: 02/07/06  6:28 AM   Result Value Ref Range    Ventricular Rate 77 BPM    Atrial Rate 77 BPM    P-R Interval 138 ms    QRS Duration 90 ms    Q-T Interval 416 ms    QTC Calculation (Bezet) 470 ms    Calculated P Axis 27 degrees    Calculated R Axis 29 degrees    Calculated T Axis 12 degrees    Diagnosis       Normal sinus rhythm  Normal ECG  When compared with ECG of 28-JAN-2012 00:54,  No significant change was found          HOME MEDS  Prior to Admission Medications   Prescriptions Last Dose Informant Patient Reported? Taking?   citalopram (CELEXA) 10 mg tablet 4/6/2019 at Unknown time  Yes Yes   Sig: Take  by mouth daily.       Facility-Administered Medications: None       CURRENT MEDS  Current Facility-Administered Medications   Medication Dose Route Frequency    atorvastatin (LIPITOR) tablet 40 mg  40 mg Oral QHS    0.9% sodium chloride infusion  75 mL/hr IntraVENous CONTINUOUS    sodium chloride (NS) flush 5-40 mL  5-40 mL IntraVENous Q8H    aspirin chewable tablet 81 mg  81 mg Oral DAILY    heparin (porcine) injection 5,000 Units  5,000 Units SubCUTAneous Q8H    metoclopramide HCl (REGLAN) injection 5 mg  5 mg IntraVENous Q6H       Stroke workup    MRI Brain  Normal    Carotids:   Pending    TTE:   Pending    Stroke labs:  HgBA1c    Lab Results   Component Value Date/Time    Hemoglobin A1c 4.9 04/07/2019 05:21 AM     LDL   Lab Results   Component Value Date/Time    LDL, calculated 95.4 04/07/2019 05:21 AM       IMPRESSION:  Manasa Lomas is a 42 y.o. male who presents with vertigo. MRI brain nl. Non focal exam. Suspect peripheral vertigo rather than TIA. RECOMMENDATIONS:  1. Meclizine prn  2. If symptoms are not getting better, outpatient vestibular rehab. Call with questions.          Jasmin Muir MD  Neurologist

## 2019-04-07 NOTE — PROGRESS NOTES
Hospitalist Progress Note NAME: Josette Mcnulty :  1980 MRN:  [id number] Assessment / Plan: 
Dizziness with nausea and vomiting likely related to BPPV however given bilateral tingling will rule out TIA 
-CT head neg, MRI head neg 
-awaiting for carotid duplex, Echo 
-CVA labs to include: TSH wnl, A1C, Lipid pending 
-cont' supportive management: IV zofran/reglan prn, meclizine prn 
-cont' asa, statin 
-CLD, advance as tolerated, gentle IVF for another 12 hrs -PT/OT consulted 
-neuro consultation Hypokalemia due to vomiting 
-repleted, wnl today Generalized anxiety disorder 
-hold Celexa due to interaction with Reglan 
  
 
Code status: Full Prophylaxis: Hep SQ Recommended Disposition: TBD Subjective: Chief Complaint / Reason for Physician Visit Pt seen. C/o dizziness but improved since admission. Tolerating diet. Discussed with RN events overnight. Review of Systems: 
Symptom Y/N Comments  Symptom Y/N Comments Fever/Chills n   Chest Pain n   
Poor Appetite    Edema Cough    Abdominal Pain n   
Sputum    Joint Pain SOB/TINAJERO n   Pruritis/Rash Nausea/vomit    Tolerating PT/OT Diarrhea    Tolerating Diet Constipation    Other Could NOT obtain due to:   
 
Objective: VITALS:  
Last 24hrs VS reviewed since prior progress note. Most recent are: 
Patient Vitals for the past 24 hrs: 
 Temp Pulse Resp BP SpO2  
19 0800 97.9 °F (36.6 °C) 79 18 134/81 98 % 19 0400 97.4 °F (36.3 °C) 79 18 139/88 99 % 19 0130 97.6 °F (36.4 °C) 75 19 119/71 96 % 19 0100  76 28 126/77 96 % 19 0030  78 19 120/79 97 % 19 2330  77 20 133/75 96 % 19 2130  85 18 121/68 96 % 19 1930  79 20 121/74 91 % Intake/Output Summary (Last 24 hours) at 2019 9217 Last data filed at 2019 0600 Gross per 24 hour Intake 1868.33 ml Output 400 ml Net 1468.33 ml PHYSICAL EXAM: 
 General: WD, WN. Alert, cooperative, no acute distress   
EENT:  EOMI. Anicteric sclerae. MMM Resp:  CTA bilaterally, no wheezing or rales. No accessory muscle use CV:  Regular  rhythm,  No edema GI:  Soft, Non distended, Non tender.  +Bowel sounds Neurologic:  Alert and oriented X 3, normal speech, Psych:   Good insight. Not anxious nor agitated Skin:  No rashes. No jaundice Reviewed most current lab test results and cultures  YES Reviewed most current radiology test results   YES Review and summation of old records today    NO Reviewed patient's current orders and MAR    YES 
PMH/SH reviewed - no change compared to H&P 
________________________________________________________________________ Care Plan discussed with: 
  Comments Patient x Family  x Wife/mom RN x Care Manager Consultant Multidiciplinary team rounds were held today with , nursing, pharmacist and clinical coordinator. Patient's plan of care was discussed; medications were reviewed and discharge planning was addressed. ________________________________________________________________________ Total NON critical care TIME:  35  Minutes Total CRITICAL CARE TIME Spent:   Minutes non procedure based Comments >50% of visit spent in counseling and coordination of care    
________________________________________________________________________ Gilford Lack, MD  
 
Procedures: see electronic medical records for all procedures/Xrays and details which were not copied into this note but were reviewed prior to creation of Plan. LABS: 
I reviewed today's most current labs and imaging studies. Pertinent labs include: 
Recent Labs 04/07/19 
0521 04/06/19 1920 WBC 10.2 10.2 HGB 14.7 16.1 HCT 39.5 43.2  201 Recent Labs 04/07/19 
0521 04/06/19 1920  138  
K 4.2 2.7*  
 103 CO2 27 24 * 154* BUN 16 20 CREA 0.74 0.94  
 CA 8.3* 8.4* ALB  --  4.0 TBILI  --  0.9 SGOT  --  30 ALT  --  63 Signed: Travis Morrison MD

## 2019-04-07 NOTE — PROGRESS NOTES
Chart reviewed, spoke to nursing, and spoke to patient and wife. It seems clear that pt did not have a strok or TIA and has/had BPPV. Explained that since pt would be here Monday AM that he will be seen then for eval by a therapist that is better suited to treat this condition. This Simona Dean also explained that there are multiple OP PT settings where therapists specialize in this treatment with head movements etc.  Pt and wife agreeable to this. Pt will be seen Monday.   Thanks for this referral.

## 2019-04-07 NOTE — ED NOTES
TRANSFER - OUT REPORT: 
 
Verbal report given to Anjali Spears RN on Paddy Núñez  being transferred to room 32023 [phone] for routine progression of care. Made aware of need for additional IV potassium. Called pharmacy and made aware that third bag of 10mEq potassium will not be able to be pulled by time second bag finalizes; pharmacist will be putting in a 10mEq one time dose. Report consisted of patients Situation, Background, Assessment and  
Recommendations(SBAR). Information from the following report(s) SBAR, Procedure Summary, Intake/Output, Recent Results and Cardiac Rhythm nsr was reviewed with the receiving nurse. Lines:  
Peripheral IV 04/06/19 Left Antecubital (Active) Peripheral IV 04/06/19 Anterior;Proximal;Right Forearm (Active) Opportunity for questions and clarification was provided. Patient transported with: 
 [employer]

## 2019-04-08 ENCOUNTER — APPOINTMENT (OUTPATIENT)
Dept: NON INVASIVE DIAGNOSTICS | Age: 39
End: 2019-04-08
Attending: INTERNAL MEDICINE
Payer: COMMERCIAL

## 2019-04-08 ENCOUNTER — APPOINTMENT (OUTPATIENT)
Dept: VASCULAR SURGERY | Age: 39
End: 2019-04-08
Attending: INTERNAL MEDICINE
Payer: COMMERCIAL

## 2019-04-08 VITALS
SYSTOLIC BLOOD PRESSURE: 147 MMHG | RESPIRATION RATE: 18 BRPM | DIASTOLIC BLOOD PRESSURE: 93 MMHG | HEART RATE: 74 BPM | TEMPERATURE: 97.8 F | OXYGEN SATURATION: 100 %

## 2019-04-08 PROBLEM — I65.23 BILATERAL CAROTID ARTERY STENOSIS: Status: ACTIVE | Noted: 2019-04-08

## 2019-04-08 LAB
ECHO AO ROOT DIAM: 3.56 CM
ECHO AV AREA PEAK VELOCITY: 2.3 CM2
ECHO AV AREA/BSA PEAK VELOCITY: 0.9 CM2/M2
ECHO AV CUSP MM: 0 CM
ECHO AV PEAK GRADIENT: 7.2 MMHG
ECHO AV PEAK VELOCITY: 134.4 CM/S
ECHO EST RA PRESSURE: 10 MMHG
ECHO LA MAJOR AXIS: 3.07 CM
ECHO LA TO AORTIC ROOT RATIO: 0.86
ECHO LV INTERNAL DIMENSION DIASTOLIC: 4.69 CM (ref 4.2–5.9)
ECHO LV INTERNAL DIMENSION SYSTOLIC: 3.88 CM
ECHO LV IVSD: 1.31 CM (ref 0.6–1)
ECHO LV MASS 2D: 282.7 G (ref 88–224)
ECHO LV POSTERIOR WALL DIASTOLIC: 1.3 CM (ref 0.6–1)
ECHO LV POSTERIOR WALL SYSTOLIC: 1.21 CM
ECHO LVOT DIAM: 1.91 CM
ECHO LVOT PEAK GRADIENT: 4.4 MMHG
ECHO LVOT PEAK VELOCITY: 105.42 CM/S
ECHO LVOT SV: 66 ML
ECHO LVOT VTI: 22.99 CM
ECHO MV A VELOCITY: 67.44 CM/S
ECHO MV AREA PHT: 4.2 CM2
ECHO MV AREA VTI: 2.1 CM2
ECHO MV E DECELERATION TIME (DT): 182.2 MS
ECHO MV E VELOCITY: 96.57 CM/S
ECHO MV E/A RATIO: 1.43
ECHO MV MAX VELOCITY: 104.08 CM/S
ECHO MV MEAN GRADIENT: 1.4 MMHG
ECHO MV PEAK GRADIENT: 4.3 MMHG
ECHO MV PRESSURE HALF TIME (PHT): 52.8 MS
ECHO MV REGURGITANT PEAK GRADIENT: 4.8 MMHG
ECHO MV REGURGITANT PEAK VELOCITY: 109.89 CM/S
ECHO MV VTI: 31.52 CM
ECHO PULMONARY ARTERY SYSTOLIC PRESSURE (PASP): 34.9 MMHG
ECHO RIGHT VENTRICULAR SYSTOLIC PRESSURE (RVSP): 34.9 MMHG
ECHO TV MAX VELOCITY: 257.23 CM/S
ECHO TV PEAK GRADIENT: 26.5 MMHG
ECHO TV REGURGITANT MAX VELOCITY: 249.39 CM/S
ECHO TV REGURGITANT PEAK GRADIENT: 24.9 MMHG
LEFT CCA DIST DIAS: 21.8 CM/S
LEFT CCA DIST SYS: 84.5 CM/S
LEFT CCA PROX DIAS: 18 CM/S
LEFT CCA PROX SYS: 87.7 CM/S
LEFT ECA DIAS: 6.77 CM/S
LEFT ECA SYS: 71 CM/S
LEFT ICA DIST DIAS: 24.3 CM/S
LEFT ICA DIST SYS: 62.8 CM/S
LEFT ICA MID DIAS: 25.7 CM/S
LEFT ICA MID SYS: 66.1 CM/S
LEFT ICA PROX DIAS: 23.8 CM/S
LEFT ICA PROX SYS: 46.3 CM/S
LEFT ICA/CCA SYS: 0.78
LEFT SUBCLAVIAN DIAS: 0 CM/S
LEFT SUBCLAVIAN SYS: 81.7 CM/S
LEFT VERTEBRAL DIAS: 19.28 CM/S
LEFT VERTEBRAL SYS: 46.5 CM/S
RIGHT CCA DIST DIAS: 22.5 CM/S
RIGHT CCA DIST SYS: 70.9 CM/S
RIGHT CCA PROX DIAS: 16.6 CM/S
RIGHT CCA PROX SYS: 75.1 CM/S
RIGHT ECA DIAS: 5.8 CM/S
RIGHT ECA SYS: 68.6 CM/S
RIGHT ICA DIST DIAS: 31 CM/S
RIGHT ICA DIST SYS: 69.6 CM/S
RIGHT ICA MID DIAS: 22.5 CM/S
RIGHT ICA MID SYS: 51.4 CM/S
RIGHT ICA PROX DIAS: 15.1 CM/S
RIGHT ICA PROX SYS: 37.9 CM/S
RIGHT ICA/CCA SYS: 1
RIGHT SUBCLAVIAN DIAS: 0 CM/S
RIGHT SUBCLAVIAN SYS: 93.9 CM/S
RIGHT VERTEBRAL DIAS: 15.03 CM/S
RIGHT VERTEBRAL SYS: 41.3 CM/S

## 2019-04-08 PROCEDURE — 96372 THER/PROPH/DIAG INJ SC/IM: CPT

## 2019-04-08 PROCEDURE — 97530 THERAPEUTIC ACTIVITIES: CPT

## 2019-04-08 PROCEDURE — 93306 TTE W/DOPPLER COMPLETE: CPT

## 2019-04-08 PROCEDURE — 99218 HC RM OBSERVATION: CPT

## 2019-04-08 PROCEDURE — 74011250636 HC RX REV CODE- 250/636: Performed by: INTERNAL MEDICINE

## 2019-04-08 PROCEDURE — 96376 TX/PRO/DX INJ SAME DRUG ADON: CPT

## 2019-04-08 PROCEDURE — 97112 NEUROMUSCULAR REEDUCATION: CPT

## 2019-04-08 PROCEDURE — 74011250637 HC RX REV CODE- 250/637: Performed by: INTERNAL MEDICINE

## 2019-04-08 PROCEDURE — 97116 GAIT TRAINING THERAPY: CPT

## 2019-04-08 PROCEDURE — 93880 EXTRACRANIAL BILAT STUDY: CPT

## 2019-04-08 PROCEDURE — 97161 PT EVAL LOW COMPLEX 20 MIN: CPT

## 2019-04-08 PROCEDURE — 97165 OT EVAL LOW COMPLEX 30 MIN: CPT

## 2019-04-08 RX ORDER — MECLIZINE HYDROCHLORIDE 25 MG/1
25 TABLET ORAL
Qty: 30 TAB | Refills: 0 | Status: SHIPPED | OUTPATIENT
Start: 2019-04-08 | End: 2019-04-18

## 2019-04-08 RX ORDER — MECLIZINE HYDROCHLORIDE 25 MG/1
25 TABLET ORAL
Qty: 30 TAB | Refills: 0 | Status: SHIPPED | OUTPATIENT
Start: 2019-04-08 | End: 2019-04-08

## 2019-04-08 RX ADMIN — HEPARIN SODIUM 5000 UNITS: 5000 INJECTION INTRAVENOUS; SUBCUTANEOUS at 06:00

## 2019-04-08 RX ADMIN — ASPIRIN 81 MG 81 MG: 81 TABLET ORAL at 09:43

## 2019-04-08 RX ADMIN — Medication 10 ML: at 05:58

## 2019-04-08 RX ADMIN — METOCLOPRAMIDE 5 MG: 5 INJECTION, SOLUTION INTRAMUSCULAR; INTRAVENOUS at 05:58

## 2019-04-08 NOTE — PROGRESS NOTES
DC instructions reviewed with patient. He verbalizes understanding of all information provided. Waiting for ride to take him home.

## 2019-04-08 NOTE — PROGRESS NOTES
Problem: Patient Education: Go to Patient Education Activity Goal: Patient/Family Education Outcome: Progressing Towards Goal 
  
Problem: Falls - Risk of 
Goal: *Absence of Falls Description Document Raymundo Ramirez Fall Risk and appropriate interventions in the flowsheet. Outcome: Progressing Towards Goal 
  
Problem: TIA/CVA Stroke: 0-24 hours Goal: Off Pathway (Use only if patient is Off Pathway) Outcome: Progressing Towards Goal

## 2019-04-08 NOTE — ROUTINE PROCESS
Bedside and Verbal shift change report given to [facility] (oncoming nurse) by [employer] (offgoing nurse). Report included the following information SBAR, Kardex, ED Summary, MAR, Recent Results and Cardiac Rhythm NSR. 
  
Zone Phone:   [phone] 
  
  
Significant changes during shift:  [id number] Melatonin to help sleep through the night. No other significant change.  
  
  
  
Patient Information 
  
Eliz Marr 
42 y.o. 
4/6/2019  6:53 PM by Jersey Guajardo MD. Eliz Marr was admitted from Home 
  
Problem List 
  
    
Patient Active Problem List  
  Diagnosis Date Noted  TIA (transient ischemic attack) 04/06/2019  BPPV (benign paroxysmal positional vertigo) 04/06/2019  
  
No past medical history on file. 
  
  
Core Measures: 
  
CVA: Yes Yes CHF:No No 
PNA:No No 
  
Activity Status: 
  
OOB to Chair Yes Ambulated this shift No  
Bed Rest No 
  
  
  
LINES AND DRAINS: 
  
PIV 
  
DVT prophylaxis: 
  
DVT prophylaxis Med- Yes DVT prophylaxis SCD or CLAUDIA- No  
  
Wounds: (If Applicable) 
  
Wounds- No 
  
Location  
  
Patient Safety: 
  
Falls Score Total Score: 1 Safety Level_______ Bed Alarm On? No 
Sitter? No 
  
Plan for upcoming shift: echo, carotids   
  
  
Discharge Plan: No  
  
Active Consults: 
IP CONSULT TO HOSPITALIST 
IP CONSULT TO NEUROLOGY

## 2019-04-08 NOTE — CONSULTS
NEUROLOGY NOTE     Chief Complaint   Patient presents with    Vomiting     Patient arrvied via EMS for vomiting with dizziness, BS en route 144, wife reports pt became dizzy and diaphoretic with vomiting 1.5 hours PTA    Dizziness       SUBJECTIVE:  Symptoms resolved    HPI  Uli Baker is a 42 y.o. male who presents to the hospital because of vertigo. Yesterday he was in the kitchen and had sudden onset room spinning sensation. It got worse with eye opening and movement of the head. Pt came tot hospital. MRI brain was normal. Symptoms much better now. ROS  A ten system review of constitutional, cardiovascular, respiratory, musculoskeletal, endocrine, skin, SHEENT, genitourinary, psychiatric and neurologic systems was obtained and is unremarkable except as stated in HPI     PMH  No past medical history on file. FH  No family history on file. SH  Social History     Socioeconomic History    Marital status:      Spouse name: Not on file    Number of children: Not on file    Years of education: Not on file    Highest education level: Not on file   Tobacco Use    Smoking status: Never Smoker    Smokeless tobacco: Never Used   Substance and Sexual Activity    Alcohol use:  Yes     Alcohol/week: 4.8 oz     Types: 8 Cans of beer per week    Drug use: Never    Sexual activity: Yes       ALLERGIES  No Known Allergies    PHYSICAL EXAMINATION:   Patient Vitals for the past 24 hrs:   Temp Pulse Resp BP SpO2   04/08/19 1021    (!) (P) 141/92    04/08/19 1011    (!) 139/100    04/08/19 1006    139/89    04/08/19 0358 97.6 °F (36.4 °C) 66 18 124/79 98 %   04/07/19 2318 97.8 °F (36.6 °C) 86 18 130/82 99 %   04/07/19 1905 98.2 °F (36.8 °C) 71 18 132/81 98 %   04/07/19 1544 97.7 °F (36.5 °C) 81 18 128/79 98 %   04/07/19 1145 98.1 °F (36.7 °C) 88 18 118/72 98 %        General:   General appearance: Pt is in no acute distress   Distal pulses are preserved    Neurological Examination:   Mental Status:  AAO x3. Speech is fluent. Follows commands, has normal fund of knowledge, attention, short term recall, comprehension and insight. Cranial Nerves: Visual fields are full. PERRL, Extraocular movements are full. Facial sensation intact. Facial movement intact. Hearing intact to conversation. Palate elevates symmetrically. Shoulder shrug symmetric. Tongue midline. Motor: Strength is 5/5 in all 4 ext. Normal tone. No atrophy. Sensation: Normal to light touch    Reflexes: DTRs 2+ throughout. Plantar responses downgoing. Coordination/Cerebellar: Intact to finger-nose-finger     Gait: deferred    Skin: No significant bruising or lacerations.     LAB DATA REVIEWED:    Recent Results (from the past 24 hour(s))   DUPLEX CAROTID BILATERAL    Collection Time: 04/08/19  7:50 AM   Result Value Ref Range    Right subclavian sys 93.9 cm/s    RIGHT SUBCLAVIAN ARTERY D 0.00 cm/s    Right cca dist sys 70.9 cm/s    Right CCA dist yee 22.5 cm/s    Right CCA prox sys 75.1 cm/s    Right CCA prox yee 16.6 cm/s    Right ICA dist sys 69.6 cm/s    Right ICA dist yee 31.0 cm/s    Right ICA mid sys 51.4 cm/s    Right ICA mid yee 22.5 cm/s    Right ICA prox sys 37.9 cm/s    Right ICA prox yee 15.1 cm/s    Right eca sys 68.6 cm/s    RIGHT EXTERNAL CAROTID ARTERY D 5.80 cm/s    Right vertebral sys 41.3 cm/s    RIGHT VERTEBRAL ARTERY D 15.03 cm/s    Right ICA/CCA sys 1.0     Left subclavian sys 81.7 cm/s    LEFT SUBCLAVIAN ARTERY D 0.00 cm/s    Left CCA dist sys 84.5 cm/s    Left CCA dist yee 21.8 cm/s    Left CCA prox sys 87.7 cm/s    Left CCA prox yee 18.0 cm/s    Left ICA dist sys 62.8 cm/s    Left ICA dist yee 24.3 cm/s    Left ICA mid sys 66.1 cm/s    Left ICA mid yee 25.7 cm/s    Left ICA prox sys 46.3 cm/s    Left ICA prox yee 23.8 cm/s    Left ECA sys 71.0 cm/s    LEFT EXTERNAL CAROTID ARTERY D 6.77 cm/s    Left vertebral sys 46.5 cm/s    LEFT VERTEBRAL ARTERY D 19.28 cm/s    Left ICA/CCA sys 0.78    ECHO ADULT COMPLETE    Collection Time: 04/08/19  8:45 AM   Result Value Ref Range    Aortic Valve Systolic Peak Velocity 752.18 cm/s    Aortic Valve Area by Continuity of Peak Velocity 2.3 cm2    AoV PG 7.2 mmHg    LVIDd 4.69 4.2 - 5.9 cm    LVPWd 1.30 (A) 0.6 - 1.0 cm    LVIDs 3.88 cm    IVSd 1.31 (A) 0.6 - 1.0 cm    LVOT d 1.91 cm    LVOT Peak Velocity 105.42 cm/s    LVOT Peak Gradient 4.4 mmHg    LVOT VTI 22.99 cm    MVA (PHT) 4.2 cm2    MV A Kar 67.44 cm/s    MV E Kar 96.57 cm/s    MV E/A 1.43     MV Mean Gradient 1.4 mmHg    Mitral Valve Annulus Velocity Time Integral 31.52 cm    Left Atrium to Aortic Root Ratio 0.86     TV PG 26.5 mmHg    LV Mass .7 (A) 88 - 224 g    LVPWs 1.21 cm    RVSP 34.9 mmHg    MV Peak Gradient 4.3 mmHg    Est. RA Pressure 10.0 mmHg    Mitral Regurgitant Peak Velocity 109.89 cm/s    Mitral Valve E Wave Deceleration Time 182.2 ms    Mitral Valve Pressure Half-time 52.8 ms    Left Atrium Major Axis 3.07 cm    Triscuspid Valve Regurgitation Peak Gradient 24.9 mmHg    Mitral Valve Max Velocity 104.08 cm/s    Tricuspid Valve Max Velocity 257.23 cm/s    MVA VTI 2.1 cm2    LVOT SV 66.0 ml    TR Max Velocity 249.39 cm/s    PASP 34.9 mmHg    MR Peak Gradient 4.8 mmHg    Ao Root D 3.56 cm    NORMA/BSA Pk Kar 0.9 cm2/m2    AV Cusp 0.00 cm   EKG, 12 LEAD, INITIAL    Collection Time: 02/07/06  6:28 AM   Result Value Ref Range    Ventricular Rate 77 BPM    Atrial Rate 77 BPM    P-R Interval 138 ms    QRS Duration 90 ms    Q-T Interval 416 ms    QTC Calculation (Bezet) 470 ms    Calculated P Axis 27 degrees    Calculated R Axis 29 degrees    Calculated T Axis 12 degrees    Diagnosis       Normal sinus rhythm  Normal ECG  When compared with ECG of 28-JAN-2012 00:54,  No significant change was found          HOME MEDS  Prior to Admission Medications   Prescriptions Last Dose Informant Patient Reported?  Taking?   citalopram (CELEXA) 10 mg tablet 4/6/2019 at Unknown time  Yes Yes   Sig: Take  by mouth daily. Facility-Administered Medications: None       CURRENT MEDS  Current Facility-Administered Medications   Medication Dose Route Frequency    Weight/Height needed   1 Each Other ONCE    atorvastatin (LIPITOR) tablet 40 mg  40 mg Oral QHS    sodium chloride (NS) flush 5-40 mL  5-40 mL IntraVENous Q8H    aspirin chewable tablet 81 mg  81 mg Oral DAILY    heparin (porcine) injection 5,000 Units  5,000 Units SubCUTAneous Q8H    metoclopramide HCl (REGLAN) injection 5 mg  5 mg IntraVENous Q6H       Stroke workup    MRI Brain  Normal    Carotids: The right ICA is normal.  The right vertebral is antegrade. The left ICA is normal.  The left vertebral is antegrade. TTE:   Estimated left ventricular ejection fraction is 56 - 60%. Mild aortic valve leaflet calcification present without reduced excursion. Stroke labs:  HgBA1c    Lab Results   Component Value Date/Time    Hemoglobin A1c 4.9 04/07/2019 05:21 AM     LDL   Lab Results   Component Value Date/Time    LDL, calculated 95.4 04/07/2019 05:21 AM       IMPRESSION:  Cinthia Cote is a 42 y.o. male who presents with vertigo. MRI brain nl. Non focal exam. Suspect peripheral vertigo rather than TIA. RECOMMENDATIONS:  1. Meclizine prn  2. If symptoms are not getting better, outpatient vestibular rehab. Call with questions.          Suma Pizano MD  Neurologist

## 2019-04-08 NOTE — PROGRESS NOTES
Reason for Admission:   Patient came to ed with complaints of dizziness and vomiting. He lives in three story home with his wife. He was independent prior to coming to the hospital. He works and drives. He denies using dme at home. RRAT Score:    5 Plan for utilizing home health:   He has not used home health services or snf in the past.     
                 
Current Advanced Directive/Advance Care Plan: Not on file Likelihood of Readmission:  Low Transition of Care Plan:     Patient plans to return home with wife when medically stable. He will follow up with medical care team when discharged. Care Management Interventions PCP Verified by CM: Yes Transition of Care Consult (CM Consult): Discharge Planning Physical Therapy Consult: Yes Occupational Therapy Consult: Yes Speech Therapy Consult: Yes Current Support Network: Lives with Spouse Confirm Follow Up Transport: Family Plan discussed with Pt/Family/Caregiver: Yes Discharge Location Discharge Placement: Home

## 2019-04-08 NOTE — PROGRESS NOTES
PHYSICAL THERAPY EVALUATION WITH DISCHARGE Patient: Debra Rodriguez (29 y.o. male) Date: 4/8/2019 Primary Diagnosis: TIA (transient ischemic attack) [G45.9] BPPV (benign paroxysmal positional vertigo) [H81.10] Precautions: vertigo ASSESSMENT :  
Based on the objective data described below, patient presents with [place] overall for functional mobility. Gait training completed  Independenly, 200 feet without DME. Noted decreased arm swing and slowed movement but no LOB and mild exacerbation of dizziness sx. No LOB or increase in sx with cervical rotation. Assessed for BPPV via [place], which was negative bilaterally today. No change in dizziness and no observed nystagmus on either side. BP mildly elevated but stable throughout. The patient's wife is an RN and was very inquisitive regarding treatment of BPPV. Provided a handout describing the modified Epley and Delorise Gallon exercises for home. Encouraged the patient and his wife to seek outpatient neuro services by a vestibular PT or ENT should symptoms return or persist. 
 
 
Discharge recommendations: Outpatient vestibular PT, ONLY if sx return or persist 
  
Equipment recommendations for successful discharge (if) home: None PLAN : 
Discharging further skilled acute physical therapy at this time. SUBJECTIVE:  
Patient stated I am a little dizzy when I get up.  OBJECTIVE DATA SUMMARY:  
HISTORY:   
No past medical history on file. No past surgical history on file. Prior Level of Function/Home Situation: independent and working Personal factors and/or comorbidities impacting plan of care:  
 
Home Situation Home Environment: Private residence # Steps to Enter: 3 Rails to Enter: No 
One/Two Story Residence: (12 steps between) Interior Rails: Left Living Alone: No 
Support Systems: Family member(s), Spouse/Significant Other/Partner Patient Expects to be Discharged to[de-identified] Private residence Current DME Used/Available at Home: [address], [address] Tub or Shower Type: Shower EXAMINATION/PRESENTATION/DECISION MAKING:  
Critical Behavior: 
Neurologic State: Alert Orientation Level: Oriented X4 Cognition: Appropriate decision making, Appropriate for age attention/concentration, Appropriate safety awareness Hearing: Auditory Auditory Impairment: None Skin:   
Edema:  
Range Of Motion: 
AROM: Within functional limits Strength:   
Strength: Within functional limits Tone & Sensation:  
Tone: Normal 
  
  
  
  
Sensation: Intact Coordination: 
Coordination: Within functional limits Vision:  
  
Functional Mobility: 
Bed Mobility: 
  
Supine to Sit: Independent; Additional time Transfers: 
Sit to Stand: Independent; Additional time Balance:  
Sitting: Intact Standing: Intact Ambulation/Gait Training: 
Distance (ft): 150 Feet (ft) Assistive Device: Gait belt Ambulation - Level of Assistance: Contact guard assistance Gait Description (WDL): Exceptions to [facility] Gait Abnormalities: Altered arm swing;Decreased step clearance Base of Support: Widened Speed/Lula: Slow Physical Therapy Evaluation Charge Determination History Examination Presentation Decision-Making MEDIUM  Complexity : 1-2 comorbidities / personal factors will impact the outcome/ POC  LOW Complexity : 1-2 Standardized tests and measures addressing body structure, function, activity limitation and / or participation in recreation  LOW Complexity : Stable, uncomplicated  LOW Complexity : FOTO score of  Based on the above components, the patient evaluation is determined to be of the following complexity level: LOW Activity Tolerance:  
Good Please refer to the flowsheet for vital signs taken during this treatment. After treatment patient left:  
Up in chair Call light within reach RN notified Family at bedside COMMUNICATION/EDUCATION:  
The patients plan of care was discussed with: Registered Nurse. Thank you for this referral. 
Vibha Whyte, PT, DPT Time Calculation: 33 mins

## 2019-04-08 NOTE — DISCHARGE SUMMARY
Discharge Summary      Name: Isa Gonzalez  [id number]  YOB: 1980 (Age: 42 y.o.)   Date of Admission: 4/6/2019  Date of Discharge: 4/8/2019  Attending Physician: Dalia Bonilla MD    Discharge Diagnosis:   Vertigo  Hypokalemia  Anxiety disorder     Consultations:  IP CONSULT TO HOSPITALIST  IP CONSULT TO NEUROLOGY    Procedures:     Brief Admission History/Reason for Admission Per Kel Suarez MD:   This is a 68-year-old male with past medical history of generalized anxiety disorder is coming to the hospital with chief complaints of dizziness and also bilateral tingling of both arms.  Patient reports being in his usual state of health until about this evening around 5 when he started having dizziness which she described as room spinning.  He lowered himself to the ground and had at least 5 episodes of vomiting.  He also reports having associated tingling involving both his arms.  He does not report any weakness, slurred speech vision loss.  Does not report any chest pain, shortness of breath, cough or phlegm.  Does not report any abdominal pain.  Denies dysuria or urgency. On arrival to the hospital his vital signs were within normal limits.  On lab work he was noted to have a potassium of 2.7 otherwise rest of the vitals were within normal limits.  He had a CT head which showed no acute process. Brief Hospital Course by Main Problems:   Dizziness with nausea and vomiting related to BPPV, CT head neg, MRI head neg  -awaiting for carotid duplex neg for significant stenosis, Echo with nl EF. [facility] labs to include: TSH wnl, A1C 4.9, LDL 95.4. Pt's symptoms improved significantly. No neurological deficit other than dizzienss with n/v. He was started on meclizine prn for symptoms control.   Pt was evaluated by neurology, recs to cont' meclizine and vestibular rehab if symptoms persist.  Pt feels better, remains stable and is being discharge to home with outpt vestibular rehab. Hypokalemia due to vomiting  Repleted, wnl today    Generalized anxiety disorder, stable. Resume home meds    Discharge Exam:  Patient seen and examined by me on discharge day. Pertinent Findings:  Visit Vitals  BP (!) 147/93   Pulse 74   Temp 97.8 °F (36.6 °C)   Resp 18   SpO2 100%     Gen:    Not in distress  Chest: Clear lungs  CVS:   Regular rhythm. No edema  Abd:  Soft, not distended, not tender    Discharge/Recent Laboratory Results:  Recent Labs     04/07/19  0521      K 4.2      CO2 27   BUN 16   *   CA 8.3*     Recent Labs     04/07/19  0521   HGB 14.7   HCT 39.5   WBC 10.2          Discharge Medications:  Current Discharge Medication List      START taking these medications    Details   OTHER outpt vestibular rehab  Qty: 1 Act, Refills: 0      meclizine (ANTIVERT) 25 mg tablet Take 1 Tab by mouth every six (6) hours as needed for Dizziness for up to 10 days. Qty: 30 Tab, Refills: 0         CONTINUE these medications which have NOT CHANGED    Details   citalopram (CELEXA) 10 mg tablet Take  by mouth daily. DISPOSITION:    Home with Family: x   Home with HH/PT/OT/RN:    SNF/LTC:    HARMONY:    OTHER:          Follow up with:   PCP : Ardeth Fabry, MD  Follow-up Information     Follow up With Specialties Details Why Contact Info    Ardeth Fabry, MD Family Practice Schedule an appointment as soon as possible for a visit  [phone]      Outpatient services   Your physician will give you a script for outpatient vestibular pt services when discharged.       Yanet Matias MD Neurology Go on 5/8/2019 Please follow up on May 8, 2019 at 9:40 arriving 20 minutes early to register with photo ID, insurance card and current list of medication  [id number] [facility]  [phone]            Code: Full  Diet: regular    Total time in minutes spent coordinating this discharge (includes going over instructions, follow-up, prescriptions, and preparing report for sign off to her PCP) :  35 minutes

## 2019-04-08 NOTE — PROGRESS NOTES
Occupational Therapy EVALUATION/discharge Patient: Jl Vogt (51 y.o. male) Date: 4/8/2019 Primary Diagnosis: TIA (transient ischemic attack) [G45.9] BPPV (benign paroxysmal positional vertigo) [H81.10] Precautions:      
 
ASSESSMENT:  
Based on the objective data described below, the patient presents with being dizzy, room spinning and episodes of vomiting and prior to this occurence, pt was living at home with wife and independent with ADLs, ILS and working. Pt was cleared to be seen and was supine in bed, and stated that his dizziness was better. Also checked orthostatics and BP was elevated but stable. Pt did not have nystagmus or increased dizziness, and was negative for BPPV after Dik-Michael pike . Pt was able to walk in room and michael and slowly turn his head and no complaints of being dizzy. He was educated on tailor sitting to dress LB and to use the shower seat when bathing. Pts wife is a nurse and will be at home with pt at discharge and no further OT needed. Recommend that pt have vestibular PT , out pt at discharge. . 
Further skilled acute occupational therapy is not indicated at this time. Discharge Recommendations: Outpatient Further Equipment Recommendations for Discharge: none SUBJECTIVE:  
Patient stated Saldaña Belt will be moving slow. I dont want to have this happen any more.  OBJECTIVE DATA SUMMARY:  
HISTORY:  
No past medical history on file. No past surgical history on file. Prior Level of Function/Environment/Context: pt lives with family, works, independent  with ADLs and ILS Occupations in which the patient is/was successful, what are the barriers preventing that success:  
Performance Patterns (routines, roles, habits, and rituals):  
Personal Interests and/or values:  
Expanded or extensive additional review of patient history:  
 
Home Situation Home Environment: Private residence # Steps to Enter: 3 Rails to Enter: No 
 One/Two Story Residence: (12 steps between) Interior Rails: Left Living Alone: No 
Support Systems: Family member(s), Spouse/Significant Other/Partner Patient Expects to be Discharged to[de-identified] Private residence Current DME Used/Available at Home: [address], [address] Tub or Shower Type: Shower Hand dominance: Right EXAMINATION OF PERFORMANCE DEFICITS: 
Cognitive/Behavioral Status: 
Neurologic State: Alert Orientation Level: Oriented X4;Appropriate for age Cognition: Appropriate decision making; Follows commands Perception: Appears intact Perseveration: No perseveration noted Safety/Judgement: Awareness of environment Skin: in good health Edema: none Hearing: Auditory Auditory Impairment: None Vision/Perceptual:   
Tracking: Able to track stimulus in all quadrants w/o difficulty Saccades: Within Defined Limits Diplopia: No   
    
  
Range of Motion: 
 
AROM: Within functional limits Strength: 
 
Strength: Within functional limits Coordination: 
Coordination: Within functional limits Fine Motor Skills-Upper: Left Intact; Right Intact Gross Motor Skills-Upper: Left Intact; Right Intact Tone & Sensation: 
 
Tone: Normal 
Sensation: Intact Balance: 
Sitting: Intact Standing: Intact Functional Mobility and Transfers for ADLs:Bed Mobility: 
Rolling: Independent Supine to Sit: Independent Sit to Supine: Independent Scooting: Independent Transfers: 
Sit to Stand: Independent Stand to Sit: Independent Bed to Chair: Independent Bathroom Mobility: Independent Toilet Transfer : Independent ADL Assessment: 
Feeding: Independent Oral Facial Hygiene/Grooming: Independent Bathing: Independent Upper Body Dressing: Independent Lower Body Dressing: Independent Toileting: Independent Cognitive Retraining Safety/Judgement: Awareness of environment Functional Measure: 
Barthel Index: 
 
Bathin Bladder: 10 Bowels: 10 
Groomin Dressing: 10 Feeding: 10 Mobility: 15 
Stairs: 5 Toilet Use: 10 Transfer (Bed to Chair and Back): 10 Total: 90/100 Percentage of impairment  
0% 1-19% 20-39% 40-59% 60-79% 80-99% 100% Barthel Score 0-100 100 99-80 79-60 59-40 20-39 1-19 
 0 The Barthel ADL Index: Guidelines 1. The index should be used as a record of what a patient does, not as a record of what a patient could do. 2. The main aim is to establish degree of independence from any help, physical or verbal, however minor and for whatever reason. 3. The need for supervision renders the patient not independent. 4. A patient's performance should be established using the best available evidence. Asking the patient, friends/relatives and nurses are the usual sources, but direct observation and common sense are also important. However direct testing is not needed. 5. Usually the patient's performance over the preceding 24-48 hours is important, but occasionally longer periods will be relevant. 6. Middle categories imply that the patient supplies over 50 per cent of the effort. 7. Use of aids to be independent is allowed. Herbert Atwood., Barthel, D.W. (1701). Functional evaluation: the Barthel Index. [address] [facility] (14)2. WALTER Almonte, Jodi Enriquez, Ajith Carr., [place], [address] [place] [address] (). Measuring the change indisability after inpatient rehabilitation; comparison of the responsiveness of the Barthel Index and Functional Davis Measure. Journal of Neurology, Neurosurgery, and Psychiatry, 66(4), 626-088. Christie Erwin, N.J.A, MAT Aguirre, & Thong Nunez M.A. (2004.) Assessment of post-stroke quality of life in cost-effectiveness studies: The usefulness of the Barthel Index and the EuroQoL-5D. [facility], 13, 782-63 Occupational Therapy Evaluation Charge Determination History Examination Decision-Making LOW Complexity : Brief history review  LOW Complexity : 1-3 performance deficits relating to physical, cognitive , or psychosocial skils that result in activity limitations and / or participation restrictions  LOW Complexity : No comorbidities that affect functional and no verbal or physical assistance needed to complete eval tasks Based on the above components, the patient evaluation is determined to be of the following complexity level: LOW Pain: 
Pain Scale 1: Numeric (0 - 10) Pain Intensity 1: 0 Activity Tolerance:  
good Please refer to the flowsheet for vital signs taken during this treatment. After treatment:  
[x]  Patient left in no apparent distress sitting up in chair 
[]  Patient left in no apparent distress in bed 
[x]  Call bell left within reach [x]  Nursing notified 
[x]  Caregiver present 
[]  Bed alarm activated COMMUNICATION/EDUCATION:  
Communication/Collaboration: 
[x]      Home safety education was provided and the patient/caregiver indicated understanding. [x]      Patient/family have participated as able and agree with findings and recommendations. []      Patient is unable to participate in plan of care at this time. Findings and recommendations were discussed with: Physical Therapist and Registered Nurse Naila Miranda OT Time Calculation: 30 mins